# Patient Record
Sex: MALE | Race: WHITE
[De-identification: names, ages, dates, MRNs, and addresses within clinical notes are randomized per-mention and may not be internally consistent; named-entity substitution may affect disease eponyms.]

---

## 2017-05-02 ENCOUNTER — HOSPITAL ENCOUNTER (OUTPATIENT)
Age: 62
Discharge: HOME | End: 2017-05-02
Payer: MEDICAID

## 2017-05-02 DIAGNOSIS — E78.5: ICD-10-CM

## 2017-05-02 DIAGNOSIS — R73.9: ICD-10-CM

## 2017-05-02 DIAGNOSIS — I25.10: Primary | ICD-10-CM

## 2017-09-26 ENCOUNTER — HOSPITAL ENCOUNTER (OUTPATIENT)
Dept: HOSPITAL 76 - EMS | Age: 62
Discharge: ANOTHER HOSPITAL | End: 2017-09-26
Attending: SURGERY
Payer: MEDICAID

## 2017-09-26 DIAGNOSIS — R07.9: Primary | ICD-10-CM

## 2017-09-26 DIAGNOSIS — R61: ICD-10-CM

## 2017-09-26 DIAGNOSIS — R11.0: ICD-10-CM

## 2017-09-26 DIAGNOSIS — R42: ICD-10-CM

## 2018-05-22 ENCOUNTER — HOSPITAL ENCOUNTER (OUTPATIENT)
Dept: HOSPITAL 76 - LAB.N | Age: 63
End: 2018-05-22
Attending: NURSE PRACTITIONER
Payer: MEDICAID

## 2018-05-22 DIAGNOSIS — N40.0: ICD-10-CM

## 2018-05-22 DIAGNOSIS — I25.10: Primary | ICD-10-CM

## 2018-05-22 DIAGNOSIS — R73.9: ICD-10-CM

## 2018-05-22 DIAGNOSIS — E78.5: ICD-10-CM

## 2018-05-22 LAB
ALBUMIN DIAFP-MCNC: 4.6 G/DL (ref 3.2–5.5)
ALBUMIN/GLOB SERPL: 1.6 {RATIO} (ref 1–2.2)
ALP SERPL-CCNC: 55 IU/L (ref 42–121)
ALT SERPL W P-5'-P-CCNC: 27 IU/L (ref 10–60)
ANION GAP SERPL CALCULATED.4IONS-SCNC: 7 MMOL/L (ref 6–13)
AST SERPL W P-5'-P-CCNC: 29 IU/L (ref 10–42)
BASOPHILS NFR BLD AUTO: 0 10^3/UL (ref 0–0.1)
BASOPHILS NFR BLD AUTO: 0.7 %
BILIRUB BLD-MCNC: 1.3 MG/DL (ref 0.2–1)
BUN SERPL-MCNC: 20 MG/DL (ref 6–20)
CALCIUM UR-MCNC: 9.2 MG/DL (ref 8.5–10.3)
CHLORIDE SERPL-SCNC: 99 MMOL/L (ref 101–111)
CHOLEST SERPL-MCNC: 173 MG/DL
CO2 SERPL-SCNC: 28 MMOL/L (ref 21–32)
CREAT SERPLBLD-SCNC: 1.1 MG/DL (ref 0.6–1.2)
EOSINOPHIL # BLD AUTO: 0.1 10^3/UL (ref 0–0.7)
EOSINOPHIL NFR BLD AUTO: 1.9 %
ERYTHROCYTE [DISTWIDTH] IN BLOOD BY AUTOMATED COUNT: 13.7 % (ref 12–15)
GFRSERPLBLD MDRD-ARVRAT: 68 ML/MIN/{1.73_M2} (ref 89–?)
GLOBULIN SER-MCNC: 2.8 G/DL (ref 2.1–4.2)
GLUCOSE SERPL-MCNC: 110 MG/DL (ref 70–100)
HDLC SERPL-MCNC: 54 MG/DL
HDLC SERPL: 3.2 {RATIO} (ref ?–5)
HGB UR QL STRIP: 15.1 G/DL (ref 14–18)
LDLC SERPL CALC-MCNC: 101 MG/DL
LDLC/HDLC SERPL: 1.9 {RATIO} (ref ?–3.6)
LYMPHOCYTES # SPEC AUTO: 1.6 10^3/UL (ref 1.5–3.5)
LYMPHOCYTES NFR BLD AUTO: 29.4 %
MCH RBC QN AUTO: 30.8 PG (ref 27–31)
MCHC RBC AUTO-ENTMCNC: 33.5 G/DL (ref 32–36)
MCV RBC AUTO: 91.9 FL (ref 80–94)
MONOCYTES # BLD AUTO: 0.7 10^3/UL (ref 0–1)
MONOCYTES NFR BLD AUTO: 12.5 %
NEUTROPHILS # BLD AUTO: 3.1 10^3/UL (ref 1.5–6.6)
NEUTROPHILS # SNV AUTO: 5.6 X10^3/UL (ref 4.8–10.8)
NEUTROPHILS NFR BLD AUTO: 55.5 %
PDW BLD AUTO: 8.5 FL (ref 7.4–11.4)
PLATELET # BLD: 253 10^3/UL (ref 130–450)
PROT SPEC-MCNC: 7.4 G/DL (ref 6.7–8.2)
RBC MAR: 4.92 10^6/UL (ref 4.7–6.1)
SODIUM SERPLBLD-SCNC: 134 MMOL/L (ref 135–145)
VLDLC SERPL-SCNC: 18 MG/DL

## 2018-05-22 PROCEDURE — 83721 ASSAY OF BLOOD LIPOPROTEIN: CPT

## 2018-05-22 PROCEDURE — 80053 COMPREHEN METABOLIC PANEL: CPT

## 2018-05-22 PROCEDURE — 80061 LIPID PANEL: CPT

## 2018-05-22 PROCEDURE — 85025 COMPLETE CBC W/AUTO DIFF WBC: CPT

## 2018-05-22 PROCEDURE — 36415 COLL VENOUS BLD VENIPUNCTURE: CPT

## 2018-05-22 PROCEDURE — 84153 ASSAY OF PSA TOTAL: CPT

## 2019-02-02 ENCOUNTER — HOSPITAL ENCOUNTER (EMERGENCY)
Dept: HOSPITAL 76 - ED | Age: 64
Discharge: HOME | End: 2019-02-02
Payer: MEDICAID

## 2019-02-02 VITALS — DIASTOLIC BLOOD PRESSURE: 72 MMHG | SYSTOLIC BLOOD PRESSURE: 136 MMHG

## 2019-02-02 DIAGNOSIS — Z79.82: ICD-10-CM

## 2019-02-02 DIAGNOSIS — S22.42XA: Primary | ICD-10-CM
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
## 2019-02-02 DIAGNOSIS — E78.00: ICD-10-CM

## 2019-02-02 PROCEDURE — 96372 THER/PROPH/DIAG INJ SC/IM: CPT

## 2019-02-02 PROCEDURE — 99283 EMERGENCY DEPT VISIT LOW MDM: CPT

## 2019-02-02 PROCEDURE — 71101 X-RAY EXAM UNILAT RIBS/CHEST: CPT

## 2019-02-02 NOTE — XRAY REPORT
Reason:  SLIP/ FELL INJURING L SIDE RIB. PAIN/TENDERNESS L

Procedure Date:  02/02/2019   

Accession Number:  [id number] / [id number]                    

Procedure:  XR  - Ribs w/PA Chest LT CPT Code:  

 

FULL RESULT:

 

 

EXAM:

LEFT RIB RADIOGRAPHY

 

EXAM DATE: 2/2/2019 09:52 PM.

 

CLINICAL HISTORY: SLIP/ FELL INJURING L SIDE RIB.  PAIN/TENDERNESS L.

 

COMPARISON: CHEST 2 VIEW PA/LAT 02/04/2015 6:27 AM.

 

TECHNIQUE: 1 view of the chest and 2 views of the ribs.

 

FINDINGS:

Bones: Mildly displaced fracture of the left lateral sixth rib. Likely 

nondisplaced fracture of the left lateral seventh rib.

 

Lungs: Minimal left basilar atelectasis. Trace right effusion. No 

pneumothorax.

 

Mediastinum: Heart and mediastinal contours are unremarkable.

 

Other: None.

IMPRESSION: Mildly displaced left lateral sixth rib fracture and likely 

nondisplaced left lateral seventh rib fracture.

 

RADIA

## 2019-02-02 NOTE — ED PHYSICIAN DOCUMENTATION
PD HPI TRUNK INJURY





- Stated complaint


Stated Complaint: RIB PX





- Chief complaint


Chief Complaint: Trauma Ch/Bk





- History obtained from


History obtained from: Patient, Family





- History of Present Illness


Location: Left chest


Type of injury: Fall


Timing - onset: How many days ago (2)


Timing - duration: Days (2)


Timing - details: Abrupt onset


Pain level max: 10


Pain level now: 10


Quality: Pain, Aching


Improved by: Rest


Worsened by: Moving, Palpating


Associated symtptoms: No: Weakness, Numbness, Tingling, Swelling, Discoloration,

Feel faint, Syncope


Contributing factors: No: Anticoagulated





- Additional information


Additional information: 





64-year-old male states that he tripped fell landing on the whole of a 

fiberglass boat on his left ribs 2 days ago.  Worsening pain since that time.  

States took Vicodin without relief at home.





Review of Systems


Constitutional: denies: Fever, Chills


Cardiac: denies: Chest pain / pressure


Respiratory: denies: Dyspnea, Hemoptysis, Wheezing


GI: denies: Vomiting


: denies: Dysuria


Skin: denies: Rash


Musculoskeletal: denies: Neck pain, Back pain


Neurologic: denies: Headache





PD PAST MEDICAL HISTORY





- Past Medical History


Cardiovascular: High cholesterol


Respiratory: None


Endocrine/Autoimmune: None


GI: None


: Benign prostate hypertrophy


HEENT: None


Derm: None





- Past Surgical History


Past Surgical History: Yes


Ortho: Rotator cuff repair, Other





- Present Medications


Home Medications: 


                                Ambulatory Orders











 Medication  Instructions  Recorded  Confirmed


 


Aspirin [Aspir 81]  02/04/15 02/04/15


 


Azithromycin [Zithromax] 250 mg PO DAILY #4 tablet 02/04/15 


 


Benzonatate [Tessalon Perle] 100 mg PO Q8H PRN #20 capsule 02/04/15 


 


Simvastatin  02/04/15 02/04/15


 


Tamsulosin [Flomax]  02/04/15 02/04/15


 


guaiFENesin/CODEINE [Robitussin AC] 10 ml PO Q6H PRN #120 udc 02/04/15 


 


Cyclobenzaprine [Flexeril] 10 mg PO TID PRN #20 tablet 02/02/19 


 


HYDROmorphone [Dilaudid] 2 mg PO Q6H PRN #14 tablet 02/02/19 














- Allergies


Allergies/Adverse Reactions: 


                                    Allergies











Allergy/AdvReac Type Severity Reaction Status Date / Time


 


Sulfa (Sulfonamide Allergy  Hives Verified 02/02/19 20:42





Antibiotics)     


 


acetaminophen [From Tylenol] AdvReac  Nausea Verified 02/02/19 20:42


 


ibuprofen AdvReac  Nausea Verified 02/02/19 20:42














- Social History


Does the pt smoke?: No


Smoking Status: Never smoker


Does the pt drink ETOH?: Yes


Does the pt have substance abuse?: No





- Immunizations


Immunizations are current?: Yes





PD ED PE NORMAL





- Vitals


Vital signs reviewed: Yes





- General


General: Alert and oriented X 3, No acute distress





- HEENT


HEENT: Moist mucous membranes





- Neck


Neck: Supple, no meningeal sign





- Cardiac


Cardiac: RRR, Strong equal pulses





- Respiratory


Respiratory: No respiratory distress, Clear bilaterally, Other (Tender to 

palpation over the left lateral ribs, approximately 5 through 10. No ecchymosis 

or crepitus)





- Abdomen


Abdomen: Soft, Non tender, Non distended





- Back


Back: No spinal TTP





- Derm


Derm: Warm and dry





- Neuro


Neuro: Alert and oriented X 3





Results





- Vitals


Vitals: 


                               Vital Signs - 24 hr











  02/02/19 02/02/19 02/02/19





  20:37 20:41 22:32


 


Temperature 36.8 C  


 


Heart Rate 71 71 70


 


Respiratory 18 18 16





Rate   


 


Blood Pressure 139/72 H 139/72 H 136/72 H


 


O2 Saturation 97 97 98








                                     Oxygen











O2 Source                      Room air

















- Rads (name of study)


  ** L rib xray


Radiology: Prelim report reviewed, EMP read contemporaneously, See rad report 

(Mildly displaced left lateral sixth rib fracture and likely nondisplaced left 

lateral seventh rib fracture)





PD MEDICAL DECISION MAKING





- ED course


Complexity details: reviewed results, re-evaluated patient, considered 

differential, d/w patient


ED course: 





64-year-old male with left-sided rib fractures.  Pain well controlled.  Does 

have a long history of narcotic use, therefore hydromorphone will be used for a 

very short period of time.  He has a tolerance from his years of prescription 

narcotic use.  Has chronic back pain.  He is well-appearing, nontoxic afebrile. 

No hypoxia.  Patient counseled regarding signs and symptoms for which I believe 

and urgent re-evaluation would be necessary. Patient with good understanding of 

and agreement to plan and is comfortable going home at this time





This document was made in part using voice recognition software. While efforts 

are made to proofread this document, sound alike and grammatical errors may 

occur.





Departure





- Departure


Disposition: 01 Home, Self Care


Clinical Impression: 


Closed rib fracture


Qualifiers:


 Encounter type: initial encounter Rib fracture type: multiple ribs Laterality: 

left Qualified Code(s): S22.42XA - Multiple fractures of ribs, left side, 

initial encounter for closed fracture





Condition: Good


Instructions:  ED Fx Rib


Follow-Up: 


Anne Orozco ARNP [Primary Care Provider] - Within 1 week


Prescriptions: 


Cyclobenzaprine [Flexeril] 10 mg PO TID PRN #20 tablet


 PRN Reason: Spasms


HYDROmorphone [Dilaudid] 2 mg PO Q6H PRN #14 tablet


 PRN Reason: rib pain


Comments: 


Use the medication as prescribed.  Return if you worsen.  Follow-up with your 

doctor for further care.  Your ribs may take several weeks to heal.


Do not drink alcohol or drive while on narcotic pain medicine. 


Note that many narcotic pain relievers also contain tylenol/acetaminophen. 

Please ensure that your total dose of acetaminophen from all sources does not 

exceed 3 grams (3000mg) per day. 


You may constipated on this medication, take a stool softener such as "Colace" 

twice a day while you are on it. Also recommend a over-the-counter laxative such

 as senna or MiraLAX any day that you do not have a bowel movement. 


If you received narcotic pain medication in the emergency department, do not 

drive or operate machinery for the next 24 hours.





Discharge Date/Time: 02/02/19 22:32

## 2019-02-12 ENCOUNTER — HOSPITAL ENCOUNTER (OUTPATIENT)
Dept: HOSPITAL 76 - DI.N | Age: 64
Discharge: HOME | End: 2019-02-12
Attending: NURSE PRACTITIONER
Payer: MEDICAID

## 2019-02-12 DIAGNOSIS — R91.8: ICD-10-CM

## 2019-02-12 DIAGNOSIS — S22.42XD: Primary | ICD-10-CM

## 2019-02-12 NOTE — XRAY REPORT
Reason:  L SIDE RIB PAIN

Procedure Date:  02/12/2019   

Accession Number:  [id number] / [id number]                    

Procedure:  XRN - Ribs 2 View LT CPT Code:  

 

FULL RESULT:

 

 

EXAM:

LEFT RIB RADIOGRAPHY

 

EXAM DATE: 2/12/2019 03:16 PM.

 

CLINICAL HISTORY: L SIDE RIB PAIN.

 

COMPARISON: RIBS W/PA CHEST LT 02/02/2019 9:42 PM.

 

TECHNIQUE: 2 views.

 

FINDINGS:

Bones: As before, mildly displaced left lateral sixth rib fracture, 

contour deformity suggesting nondisplaced left lateral seventh rib 

fracture. Suture anchors at the left shoulder.

 

Lungs: No detectable pneumothorax. Left basal opacity suggesting small 

effusion and atelectasis and/or infiltrate. Left lung volume is low. 

Right lung is excluded.

 

Mediastinum: Heart and cardiomediastinal contours are unremarkable to the 

extent visualized.

 

Other: None.

IMPRESSION:

1. Similar mildly displaced left lateral sixth rib fracture and probable 

nondisplaced left lateral seventh rib fracture.

2. Similar left basal opacity suggesting small effusion and atelectasis 

and/or infiltrate.

 

[facility]

## 2019-09-19 ENCOUNTER — HOSPITAL ENCOUNTER (OUTPATIENT)
Dept: HOSPITAL 76 - LAB.N | Age: 64
Discharge: HOME | End: 2019-09-19
Attending: NURSE PRACTITIONER
Payer: MEDICAID

## 2019-09-19 DIAGNOSIS — Z79.899: ICD-10-CM

## 2019-09-19 DIAGNOSIS — N40.0: ICD-10-CM

## 2019-09-19 DIAGNOSIS — E78.5: Primary | ICD-10-CM

## 2019-09-19 LAB
ALBUMIN DIAFP-MCNC: 4.6 G/DL (ref 3.2–5.5)
ALBUMIN/GLOB SERPL: 1.4 {RATIO} (ref 1–2.2)
ALP SERPL-CCNC: 62 IU/L (ref 42–121)
ALT SERPL W P-5'-P-CCNC: 32 IU/L (ref 10–60)
ANION GAP SERPL CALCULATED.4IONS-SCNC: 5 MMOL/L (ref 6–13)
AST SERPL W P-5'-P-CCNC: 32 IU/L (ref 10–42)
BILIRUB BLD-MCNC: 0.9 MG/DL (ref 0.2–1)
BUN SERPL-MCNC: 23 MG/DL (ref 6–20)
CALCIUM UR-MCNC: 9.5 MG/DL (ref 8.5–10.3)
CHLORIDE SERPL-SCNC: 104 MMOL/L (ref 101–111)
CHOLEST SERPL-MCNC: 179 MG/DL
CO2 SERPL-SCNC: 29 MMOL/L (ref 21–32)
CREAT SERPLBLD-SCNC: 1 MG/DL (ref 0.6–1.2)
GFRSERPLBLD MDRD-ARVRAT: 76 ML/MIN/{1.73_M2} (ref 89–?)
GLOBULIN SER-MCNC: 3.2 G/DL (ref 2.1–4.2)
GLUCOSE SERPL-MCNC: 101 MG/DL (ref 70–100)
HDLC SERPL-MCNC: 60 MG/DL
HDLC SERPL: 3 {RATIO} (ref ?–5)
LDLC SERPL CALC-MCNC: 96 MG/DL
LDLC/HDLC SERPL: 1.6 {RATIO} (ref ?–3.6)
PROT SPEC-MCNC: 7.8 G/DL (ref 6.7–8.2)
SODIUM SERPLBLD-SCNC: 138 MMOL/L (ref 135–145)
VLDLC SERPL-SCNC: 23 MG/DL

## 2019-09-19 PROCEDURE — 80061 LIPID PANEL: CPT

## 2019-09-19 PROCEDURE — 83721 ASSAY OF BLOOD LIPOPROTEIN: CPT

## 2019-09-19 PROCEDURE — 80053 COMPREHEN METABOLIC PANEL: CPT

## 2019-09-19 PROCEDURE — 36415 COLL VENOUS BLD VENIPUNCTURE: CPT

## 2019-09-19 PROCEDURE — 84153 ASSAY OF PSA TOTAL: CPT

## 2019-12-05 ENCOUNTER — HOSPITAL ENCOUNTER (OUTPATIENT)
Dept: HOSPITAL 76 - SDS | Age: 64
Discharge: HOME | End: 2019-12-05
Attending: SURGERY
Payer: MEDICAID

## 2019-12-05 VITALS — SYSTOLIC BLOOD PRESSURE: 120 MMHG | DIASTOLIC BLOOD PRESSURE: 69 MMHG

## 2019-12-05 DIAGNOSIS — Z12.11: Primary | ICD-10-CM

## 2019-12-05 DIAGNOSIS — I25.10: ICD-10-CM

## 2019-12-05 DIAGNOSIS — N40.0: ICD-10-CM

## 2019-12-05 PROCEDURE — 0DJD8ZZ INSPECTION OF LOWER INTESTINAL TRACT, VIA NATURAL OR ARTIFICIAL OPENING ENDOSCOPIC: ICD-10-PCS | Performed by: SURGERY

## 2019-12-05 PROCEDURE — 45378 DIAGNOSTIC COLONOSCOPY: CPT

## 2020-06-29 ENCOUNTER — HOSPITAL ENCOUNTER (OUTPATIENT)
Dept: HOSPITAL 76 - LAB | Age: 65
Discharge: HOME | End: 2020-06-29
Attending: FAMILY MEDICINE
Payer: MEDICAID

## 2020-06-29 DIAGNOSIS — Z11.59: Primary | ICD-10-CM

## 2020-06-29 PROCEDURE — 81599 UNLISTED MAAA: CPT

## 2021-04-15 ENCOUNTER — HOSPITAL ENCOUNTER (OUTPATIENT)
Dept: HOSPITAL 76 - LAB.N | Age: 66
Discharge: HOME | End: 2021-04-15
Attending: INTERNAL MEDICINE
Payer: MEDICAID

## 2021-04-15 DIAGNOSIS — E78.5: ICD-10-CM

## 2021-04-15 DIAGNOSIS — N40.0: ICD-10-CM

## 2021-04-15 DIAGNOSIS — G62.9: ICD-10-CM

## 2021-04-15 DIAGNOSIS — I25.10: Primary | ICD-10-CM

## 2021-04-15 DIAGNOSIS — R73.9: ICD-10-CM

## 2021-04-15 LAB
ALBUMIN DIAFP-MCNC: 4.7 G/DL (ref 3.2–5.5)
ALBUMIN/GLOB SERPL: 1.6 {RATIO} (ref 1–2.2)
ALP SERPL-CCNC: 66 IU/L (ref 42–121)
ALT SERPL W P-5'-P-CCNC: 25 IU/L (ref 10–60)
ANION GAP SERPL CALCULATED.4IONS-SCNC: 8 MMOL/L (ref 6–13)
AST SERPL W P-5'-P-CCNC: 27 IU/L (ref 10–42)
BASOPHILS NFR BLD AUTO: 0 10^3/UL (ref 0–0.1)
BASOPHILS NFR BLD AUTO: 0.4 %
BILIRUB BLD-MCNC: 0.8 MG/DL (ref 0.2–1)
BUN SERPL-MCNC: 22 MG/DL (ref 6–20)
CALCIUM UR-MCNC: 9.6 MG/DL (ref 8.5–10.3)
CHLORIDE SERPL-SCNC: 102 MMOL/L (ref 101–111)
CHOLEST SERPL-MCNC: 181 MG/DL
CO2 SERPL-SCNC: 27 MMOL/L (ref 21–32)
CREAT SERPLBLD-SCNC: 0.9 MG/DL (ref 0.6–1.2)
EOSINOPHIL # BLD AUTO: 0.1 10^3/UL (ref 0–0.7)
EOSINOPHIL NFR BLD AUTO: 1 %
ERYTHROCYTE [DISTWIDTH] IN BLOOD BY AUTOMATED COUNT: 12.7 % (ref 12–15)
EST. AVERAGE GLUCOSE BLD GHB EST-MCNC: 126 MG/DL (ref 70–100)
GFRSERPLBLD MDRD-ARVRAT: 85 ML/MIN/{1.73_M2} (ref 89–?)
GLOBULIN SER-MCNC: 2.9 G/DL (ref 2.1–4.2)
GLUCOSE SERPL-MCNC: 110 MG/DL (ref 70–100)
HBA1C MFR BLD HPLC: 6 % (ref 4.27–6.07)
HCT VFR BLD AUTO: 48.3 % (ref 42–52)
HDLC SERPL-MCNC: 60 MG/DL
HDLC SERPL: 3 {RATIO} (ref ?–5)
HGB UR QL STRIP: 15.5 G/DL (ref 14–18)
LDLC SERPL CALC-MCNC: 98 MG/DL
LDLC/HDLC SERPL: 1.6 {RATIO} (ref ?–3.6)
LYMPHOCYTES # SPEC AUTO: 1.5 10^3/UL (ref 1.5–3.5)
LYMPHOCYTES NFR BLD AUTO: 29.4 %
MCH RBC QN AUTO: 29.8 PG (ref 27–31)
MCHC RBC AUTO-ENTMCNC: 32.1 G/DL (ref 32–36)
MCV RBC AUTO: 92.9 FL (ref 80–94)
MONOCYTES # BLD AUTO: 0.7 10^3/UL (ref 0–1)
MONOCYTES NFR BLD AUTO: 13.8 %
NEUTROPHILS # BLD AUTO: 2.9 10^3/UL (ref 1.5–6.6)
NEUTROPHILS # SNV AUTO: 5.2 X10^3/UL (ref 4.8–10.8)
NEUTROPHILS NFR BLD AUTO: 55.2 %
NRBC # BLD AUTO: 0 /100WBC
NRBC # BLD AUTO: 0 X10^3/UL
PDW BLD AUTO: 10.5 FL (ref 7.4–11.4)
PLATELET # BLD: 314 10^3/UL (ref 130–450)
POTASSIUM SERPL-SCNC: 3.9 MMOL/L (ref 3.5–5)
PROT SPEC-MCNC: 7.6 G/DL (ref 6.7–8.2)
RBC MAR: 5.2 10^6/UL (ref 4.7–6.1)
SODIUM SERPLBLD-SCNC: 137 MMOL/L (ref 135–145)
TRIGL P FAST SERPL-MCNC: 116 MG/DL
TSH SERPL-ACNC: 2.13 UIU/ML (ref 0.34–5.6)
VIT B12 SERPL-MCNC: 532 PG/ML (ref 180–914)
VLDLC SERPL-SCNC: 23 MG/DL

## 2021-04-15 PROCEDURE — 82043 UR ALBUMIN QUANTITATIVE: CPT

## 2021-04-15 PROCEDURE — 36415 COLL VENOUS BLD VENIPUNCTURE: CPT

## 2021-04-15 PROCEDURE — 85025 COMPLETE CBC W/AUTO DIFF WBC: CPT

## 2021-04-15 PROCEDURE — 82570 ASSAY OF URINE CREATININE: CPT

## 2021-04-15 PROCEDURE — 83036 HEMOGLOBIN GLYCOSYLATED A1C: CPT

## 2021-04-15 PROCEDURE — 82607 VITAMIN B-12: CPT

## 2021-04-15 PROCEDURE — 84153 ASSAY OF PSA TOTAL: CPT

## 2021-04-15 PROCEDURE — 81599 UNLISTED MAAA: CPT

## 2021-04-15 PROCEDURE — 86334 IMMUNOFIX E-PHORESIS SERUM: CPT

## 2021-04-15 PROCEDURE — 80061 LIPID PANEL: CPT

## 2021-04-15 PROCEDURE — 80053 COMPREHEN METABOLIC PANEL: CPT

## 2021-04-15 PROCEDURE — 84443 ASSAY THYROID STIM HORMONE: CPT

## 2021-04-15 PROCEDURE — 84165 PROTEIN E-PHORESIS SERUM: CPT

## 2021-04-15 PROCEDURE — 84155 ASSAY OF PROTEIN SERUM: CPT

## 2021-04-15 PROCEDURE — 83721 ASSAY OF BLOOD LIPOPROTEIN: CPT

## 2021-04-17 LAB
ALBUMIN SERPL-MCNC: 4.6 G/DL (ref 3.8–4.8)
ALPHA1 GLOB SERPL ELPH-MCNC: 0.3 G/DL (ref 0.2–0.3)
ALPHA2 GLOB SERPL ELPH-MCNC: 0.6 G/DL (ref 0.5–0.9)
BETA1 GLOB FLD ELPH-MCNC: 0.5 G/DL (ref 0.4–0.6)
BETA2 GLOB FLD ELPH-MCNC: 0.4 G/DL (ref 0.2–0.5)
GAMMA GLOB SERPL ELPH-MCNC: 0.9 G/DL (ref 0.8–1.7)
IMP & REVIEW OF LAB RESULTS: (no result)

## 2021-04-30 ENCOUNTER — HOSPITAL ENCOUNTER (OUTPATIENT)
Dept: HOSPITAL 76 - LAB.WCP | Age: 66
Discharge: HOME | End: 2021-04-30
Attending: INTERNAL MEDICINE
Payer: MEDICAID

## 2021-04-30 DIAGNOSIS — R73.9: Primary | ICD-10-CM

## 2021-04-30 LAB
CREAT UR-SCNC: 190 MG/DL
MICROALBUMIN UR-MCNC: 0.4 MG/DL (ref 0–300)
MICROALBUMIN/CREAT RATIO PNL UR: 2.1 UG/MG (ref ?–30)

## 2021-04-30 PROCEDURE — 82570 ASSAY OF URINE CREATININE: CPT

## 2021-04-30 PROCEDURE — 82043 UR ALBUMIN QUANTITATIVE: CPT

## 2022-08-30 ENCOUNTER — HOSPITAL ENCOUNTER (OUTPATIENT)
Dept: HOSPITAL 76 - LAB.N | Age: 67
Discharge: HOME | End: 2022-08-30
Attending: INTERNAL MEDICINE
Payer: MEDICAID

## 2022-08-30 DIAGNOSIS — R73.01: ICD-10-CM

## 2022-08-30 DIAGNOSIS — E78.5: Primary | ICD-10-CM

## 2022-08-30 DIAGNOSIS — N18.2: ICD-10-CM

## 2022-08-30 DIAGNOSIS — N40.1: ICD-10-CM

## 2022-08-30 LAB
ALBUMIN DIAFP-MCNC: 4.5 G/DL (ref 3.2–5.5)
ALBUMIN/GLOB SERPL: 1.6 {RATIO} (ref 1–2.2)
ALP SERPL-CCNC: 59 IU/L (ref 42–121)
ALT SERPL W P-5'-P-CCNC: 26 IU/L (ref 10–60)
ANION GAP SERPL CALCULATED.4IONS-SCNC: 7 MMOL/L (ref 6–13)
AST SERPL W P-5'-P-CCNC: 24 IU/L (ref 10–42)
BASOPHILS NFR BLD AUTO: 0 10^3/UL (ref 0–0.1)
BASOPHILS NFR BLD AUTO: 0.5 %
BILIRUB BLD-MCNC: 0.8 MG/DL (ref 0.2–1)
BUN SERPL-MCNC: 25 MG/DL (ref 6–20)
CALCIUM UR-MCNC: 9.2 MG/DL (ref 8.5–10.3)
CHLORIDE SERPL-SCNC: 99 MMOL/L (ref 101–111)
CHOLEST SERPL-MCNC: 123 MG/DL
CO2 SERPL-SCNC: 27 MMOL/L (ref 21–32)
CREAT SERPLBLD-SCNC: 1 MG/DL (ref 0.6–1.2)
EOSINOPHIL # BLD AUTO: 0.1 10^3/UL (ref 0–0.7)
EOSINOPHIL NFR BLD AUTO: 1.7 %
ERYTHROCYTE [DISTWIDTH] IN BLOOD BY AUTOMATED COUNT: 13.1 % (ref 12–15)
GFRSERPLBLD MDRD-ARVRAT: 75 ML/MIN/{1.73_M2} (ref 89–?)
GLOBULIN SER-MCNC: 2.9 G/DL (ref 2.1–4.2)
GLUCOSE SERPL-MCNC: 103 MG/DL (ref 70–100)
HCT VFR BLD AUTO: 43.5 % (ref 42–52)
HDLC SERPL-MCNC: 54 MG/DL
HDLC SERPL: 2.3 {RATIO} (ref ?–5)
HGB UR QL STRIP: 14.4 G/DL (ref 14–18)
LDLC SERPL CALC-MCNC: 45 MG/DL
LDLC/HDLC SERPL: 0.8 {RATIO} (ref ?–3.6)
LYMPHOCYTES # SPEC AUTO: 1.7 10^3/UL (ref 1.5–3.5)
LYMPHOCYTES NFR BLD AUTO: 28.8 %
MCH RBC QN AUTO: 30.4 PG (ref 27–31)
MCHC RBC AUTO-ENTMCNC: 33.1 G/DL (ref 32–36)
MCV RBC AUTO: 91.8 FL (ref 80–94)
MONOCYTES # BLD AUTO: 0.7 10^3/UL (ref 0–1)
MONOCYTES NFR BLD AUTO: 11.4 %
NEUTROPHILS # BLD AUTO: 3.4 10^3/UL (ref 1.5–6.6)
NEUTROPHILS # SNV AUTO: 5.9 X10^3/UL (ref 4.8–10.8)
NEUTROPHILS NFR BLD AUTO: 57.3 %
NRBC # BLD AUTO: 0 /100WBC
NRBC # BLD AUTO: 0 X10^3/UL
PDW BLD AUTO: 10 FL (ref 7.4–11.4)
PLATELET # BLD: 281 10^3/UL (ref 130–450)
POTASSIUM SERPL-SCNC: 4.1 MMOL/L (ref 3.5–5)
PROT SPEC-MCNC: 7.4 G/DL (ref 6.7–8.2)
RBC MAR: 4.74 10^6/UL (ref 4.7–6.1)
SODIUM SERPLBLD-SCNC: 133 MMOL/L (ref 135–145)
TRIGL P FAST SERPL-MCNC: 121 MG/DL
VLDLC SERPL-SCNC: 24 MG/DL

## 2022-08-30 PROCEDURE — 83721 ASSAY OF BLOOD LIPOPROTEIN: CPT

## 2022-08-30 PROCEDURE — 83036 HEMOGLOBIN GLYCOSYLATED A1C: CPT

## 2022-08-30 PROCEDURE — 82570 ASSAY OF URINE CREATININE: CPT

## 2022-08-30 PROCEDURE — 85025 COMPLETE CBC W/AUTO DIFF WBC: CPT

## 2022-08-30 PROCEDURE — 80053 COMPREHEN METABOLIC PANEL: CPT

## 2022-08-30 PROCEDURE — 80061 LIPID PANEL: CPT

## 2022-08-30 PROCEDURE — 84153 ASSAY OF PSA TOTAL: CPT

## 2022-08-30 PROCEDURE — 36415 COLL VENOUS BLD VENIPUNCTURE: CPT

## 2022-08-30 PROCEDURE — 82043 UR ALBUMIN QUANTITATIVE: CPT

## 2022-08-31 LAB
EST. AVERAGE GLUCOSE BLD GHB EST-MCNC: 131 MG/DL (ref 70–100)
HBA1C MFR BLD HPLC: 6.2 % (ref 4.27–6.07)

## 2022-09-01 ENCOUNTER — HOSPITAL ENCOUNTER (OUTPATIENT)
Dept: HOSPITAL 76 - LAB.WCP | Age: 67
Discharge: HOME | End: 2022-09-01
Attending: INTERNAL MEDICINE
Payer: MEDICAID

## 2022-09-01 DIAGNOSIS — R73.01: Primary | ICD-10-CM

## 2022-09-01 LAB
CREAT UR-SCNC: 68.2 MG/DL
MICROALBUMIN UR-MCNC: 0.2 MG/DL (ref 0–300)
MICROALBUMIN/CREAT RATIO PNL UR: 2.9 UG/MG (ref ?–30)

## 2022-09-01 PROCEDURE — 82570 ASSAY OF URINE CREATININE: CPT

## 2022-09-01 PROCEDURE — 82043 UR ALBUMIN QUANTITATIVE: CPT

## 2022-09-19 ENCOUNTER — HOSPITAL ENCOUNTER (EMERGENCY)
Dept: HOSPITAL 76 - ED | Age: 67
Discharge: HOME | End: 2022-09-19
Payer: MEDICAID

## 2022-09-19 VITALS — DIASTOLIC BLOOD PRESSURE: 81 MMHG | SYSTOLIC BLOOD PRESSURE: 135 MMHG

## 2022-09-19 DIAGNOSIS — K59.00: Primary | ICD-10-CM

## 2022-09-19 LAB
ALBUMIN DIAFP-MCNC: 4.5 G/DL (ref 3.2–5.5)
ALBUMIN/GLOB SERPL: 1.5 {RATIO} (ref 1–2.2)
ALP SERPL-CCNC: 77 IU/L (ref 42–121)
ALT SERPL W P-5'-P-CCNC: 24 IU/L (ref 10–60)
ANION GAP SERPL CALCULATED.4IONS-SCNC: 7 MMOL/L (ref 6–13)
AST SERPL W P-5'-P-CCNC: 22 IU/L (ref 10–42)
BASOPHILS NFR BLD AUTO: 0 10^3/UL (ref 0–0.1)
BASOPHILS NFR BLD AUTO: 0.5 %
BILIRUB BLD-MCNC: 0.6 MG/DL (ref 0.2–1)
BUN SERPL-MCNC: 23 MG/DL (ref 6–20)
CALCIUM UR-MCNC: 9.8 MG/DL (ref 8.5–10.3)
CHLORIDE SERPL-SCNC: 100 MMOL/L (ref 101–111)
CO2 SERPL-SCNC: 29 MMOL/L (ref 21–32)
CREAT SERPLBLD-SCNC: 1 MG/DL (ref 0.6–1.2)
EOSINOPHIL # BLD AUTO: 0.1 10^3/UL (ref 0–0.7)
EOSINOPHIL NFR BLD AUTO: 1.4 %
ERYTHROCYTE [DISTWIDTH] IN BLOOD BY AUTOMATED COUNT: 12.9 % (ref 12–15)
GFRSERPLBLD MDRD-ARVRAT: 75 ML/MIN/{1.73_M2} (ref 89–?)
GLOBULIN SER-MCNC: 3 G/DL (ref 2.1–4.2)
GLUCOSE SERPL-MCNC: 102 MG/DL (ref 70–100)
HCT VFR BLD AUTO: 45 % (ref 42–52)
HGB UR QL STRIP: 14.9 G/DL (ref 14–18)
LIPASE SERPL-CCNC: 40 U/L (ref 22–51)
LYMPHOCYTES # SPEC AUTO: 1.4 10^3/UL (ref 1.5–3.5)
LYMPHOCYTES NFR BLD AUTO: 22.7 %
MCH RBC QN AUTO: 30.6 PG (ref 27–31)
MCHC RBC AUTO-ENTMCNC: 33.1 G/DL (ref 32–36)
MCV RBC AUTO: 92.4 FL (ref 80–94)
MONOCYTES # BLD AUTO: 0.7 10^3/UL (ref 0–1)
MONOCYTES NFR BLD AUTO: 10.6 %
NEUTROPHILS # BLD AUTO: 4 10^3/UL (ref 1.5–6.6)
NEUTROPHILS # SNV AUTO: 6.2 X10^3/UL (ref 4.8–10.8)
NEUTROPHILS NFR BLD AUTO: 64.5 %
NRBC # BLD AUTO: 0 /100WBC
NRBC # BLD AUTO: 0 X10^3/UL
PDW BLD AUTO: 9.5 FL (ref 7.4–11.4)
PLATELET # BLD: 290 10^3/UL (ref 130–450)
POTASSIUM SERPL-SCNC: 4.7 MMOL/L (ref 3.5–5)
PROT SPEC-MCNC: 7.5 G/DL (ref 6.7–8.2)
RBC MAR: 4.87 10^6/UL (ref 4.7–6.1)
SODIUM SERPLBLD-SCNC: 136 MMOL/L (ref 135–145)

## 2022-09-19 PROCEDURE — 99282 EMERGENCY DEPT VISIT SF MDM: CPT

## 2022-09-19 PROCEDURE — 96372 THER/PROPH/DIAG INJ SC/IM: CPT

## 2022-09-19 PROCEDURE — 99284 EMERGENCY DEPT VISIT MOD MDM: CPT

## 2022-09-19 PROCEDURE — 85025 COMPLETE CBC W/AUTO DIFF WBC: CPT

## 2022-09-19 PROCEDURE — 80053 COMPREHEN METABOLIC PANEL: CPT

## 2022-09-19 PROCEDURE — 83690 ASSAY OF LIPASE: CPT

## 2022-09-19 PROCEDURE — 36415 COLL VENOUS BLD VENIPUNCTURE: CPT

## 2022-09-19 PROCEDURE — 74176 CT ABD & PELVIS W/O CONTRAST: CPT

## 2022-09-19 NOTE — CT REPORT
PROCEDURE:  Abdomen/Pelvis WO

 

INDICATIONS:  R sided pain

 

TECHNIQUE:  

Noncontrast 5 mm thick sections acquired from the diaphragms to the symphysis.  5 mm coronal and sagi
ttal reformats were then performed.  For radiation dose reduction, the following was used:  automated
 exposure control, adjustment of mA and/or kV according to patient size.

 

COMPARISON:  None.

 

FINDINGS:  

Image quality:  Excellent.  

 

ABDOMEN:  

Lung bases: Bibasilar atelectasis. No pleural effusion. Heart size is normal.  

 

Solid organs:  Liver and spleen are normal in size.  Gallbladder is unremarkable.  Pancreas is normal
 in contours.  No adrenal nodules.  Kidneys are normal in size, without hydronephrosis or nephrolithi
asis.  

 

Peritoneum and bowel:  Unenhanced bowel loops demonstrate normal wall thickness and caliber. Small du
odenal diverticulum. Normal appendix. Prominent stool in the right colon. No free fluid or air.  

 

Nodes and vessels:  No retroperitoneal or mesenteric adenopathy by size criteria.  Aorta and inferior
 vena cava are normal in caliber. The proximal opacified atherosclerotic plaque. 

 

Miscellaneous: Small umbilical hernia.

 

 

PELVIS:  

Genitourinary:  Bladder wall thickness is normal.  

 

Miscellaneous:  No inguinal hernias or adenopathy. Left gluteus lipoma. 

 

Bones:  No suspicious bony lesions. Minimal height loss at L1. Anterolisthesis of L3 on L4 measuring 
0.6 cm. Large vertebral body ossifies.

 

IMPRESSION:  

1. The appendix is normal. No free fluid.

 

2. Prominent stool in the colon. This raises the possibility of constipation.

 

3. No kidney stones. No hydronephrosis.

 

Reviewed by: Johnny Yee MD on 9/19/2022 4:34 PM PDT

Approved by: Johnyn Yee MD on 9/19/2022 4:34 PM PDT

 

 

Station ID:  [id number]

## 2022-09-19 NOTE — ED PHYSICIAN DOCUMENTATION
PD HPI ABD PAIN





- Stated complaint


Stated Complaint: ABD PX





- Chief complaint


Chief Complaint: Abd Pain





- History obtained from


History obtained from: Patient





- Additional information


Additional information: 


Patient is a 67-year-old male presenting for evaluation of right lower quadrant 

pain that is been intermittent for a few weeks but more persistent over the last

few days.  It is sharp in nature.  It is worse when laying on that side.  He 

denies associated nausea, vomiting, diarrhea.  His last bowel movement was 

yesterday.  He denies dysuria or hematuria.  He reports that laying on his left 

side makes it feel better.  He has not tried anything for the pain. He denies 

previous abdominal surgeries.  He does take Plavix for a history of a TIA.  He 

denies any recent injury or trauma.  He denies chest pain, difficulty breathing,

fever, back pain, lower extremity pain.








Review of Systems


Constitutional: denies: Fever


Nose: denies: Congestion


Cardiac: denies: Chest pain / pressure


Respiratory: denies: Dyspnea


GI: reports: Abdominal Pain.  denies: Nausea, Vomiting


: denies: Dysuria, Hematuria


Musculoskeletal: denies: Back pain


Neurologic: denies: Headache





PD PAST MEDICAL HISTORY





- Past Medical History


Cardiovascular: High cholesterol


Respiratory: Pneumonia


Endocrine/Autoimmune: None


GI: Colon polyps


: None


HEENT: None


Psych: None


Musculoskeletal: Osteoarthritis, Chronic back pain


Derm: None





- Past Surgical History


Past Surgical History: Yes


Ortho: Rotator cuff repair, Spine surgery, Other





- Present Medications


Home Medications: 


                                Ambulatory Orders











 Medication  Instructions  Recorded  Confirmed


 


Aspirin [Aspir 81] 81 mg PO DAILY 02/04/15 12/05/19


 


Tamsulosin [Flomax] 0.4 mg PO DAILY 02/04/15 12/05/19


 


Cyclobenzaprine [Flexeril] 10 mg PO TID PRN #20 tablet 02/02/19 12/04/19


 


Atorvastatin Calcium 80 mg PO DAILY 12/05/19 12/05/19


 


Zolpidem Tartrate [Edluar] 10 mg PO DAILY PRN 12/05/19 12/05/19


 


polyethylene glycoL 3350 [Miralax] 17 gm PO DAILY PRN #1 ml 09/19/22 














- Allergies


Allergies/Adverse Reactions: 


                                    Allergies











Allergy/AdvReac Type Severity Reaction Status Date / Time


 


Sulfa (Sulfonamide Allergy  Hives Verified 09/19/22 12:33





Antibiotics)     


 


acetaminophen [From Tylenol] AdvReac  Nausea Verified 09/19/22 12:33


 


ibuprofen AdvReac  Nausea Verified 09/19/22 12:33














- Social History


Does the pt smoke?: No


Smoking Status: Never smoker


Does the pt drink ETOH?: Yes


Does the pt have substance abuse?: No





- Immunizations


Immunizations are current?: Yes





PD ED PE NORMAL





- General


General: Alert and oriented X 3, No acute distress, Well developed/nourished





- HEENT


HEENT: Atraumatic, Moist mucous membranes





- Neck


Neck: Supple, no meningeal sign





- Cardiac


Cardiac: RRR, No murmur, Strong equal pulses





- Respiratory


Respiratory: No respiratory distress, Clear bilaterally





- Abdomen


Abdomen: Normal bowel sounds, Soft, Non distended, Other (No masses, no hernia, 

mild right lower quadrant tenderness to palpation with no rebound or guarding). 

No: Non tender





- Back


Back: Other (Right CVA tenderness)





- Derm


Derm: Warm and dry





- Extremities


Extremities: No edema





- Neuro


Neuro: Normal speech





Results





- Vitals


Vitals: 


                               Vital Signs - 24 hr











  09/19/22 09/19/22





  12:30 16:25


 


Temperature 36.3 C L 36.8 C


 


Heart Rate 76 68


 


Respiratory 16 16





Rate  


 


Blood Pressure 137/76 H 135/81 H


 


O2 Saturation 97 99








                                     Oxygen











O2 Source                      Room air

















- Labs


Labs: 


                                Laboratory Tests











  09/19/22 09/19/22





  12:42 12:42


 


WBC  6.2 


 


RBC  4.87 


 


Hgb  14.9 


 


Hct  45.0 


 


MCV  92.4 


 


MCH  30.6 


 


MCHC  33.1 


 


RDW  12.9 


 


Plt Count  290 


 


MPV  9.5 


 


Neut # (Auto)  4.0 


 


Lymph # (Auto)  1.4 L 


 


Mono # (Auto)  0.7 


 


Eos # (Auto)  0.1 


 


Baso # (Auto)  0.0 


 


Absolute Nucleated RBC  0.00 


 


Nucleated RBC %  0.0 


 


Sodium   136


 


Potassium   4.7


 


Chloride   100 L


 


Carbon Dioxide   29


 


Anion Gap   7.0


 


BUN   23 H


 


Creatinine   1.0


 


Estimated GFR (MDRD)   75 L


 


Glucose   102 H


 


Calcium   9.8


 


Total Bilirubin   0.6


 


AST   22


 


ALT   24


 


Alkaline Phosphatase   77


 


Total Protein   7.5


 


Albumin   4.5


 


Globulin   3.0


 


Albumin/Globulin Ratio   1.5


 


Lipase   40














PD MEDICAL DECISION MAKING





- ED course


Complexity details: reviewed results, re-evaluated patient, d/w patient


ED course: 


Patient with right-sided abdominal pain which is been intermittent for weeks.  

Has mild tenderness on exam but overall exam is reassuring and he does not 

appear to have a acute abdomen.  Of vital signs and labs are reassuring.  CT 

scan is negative for appendicitis and no signs of a stone.  There is a fair 

amount of stool burden on the right side.  I reviewed these findings with the 

patient and discussed plan for MiraLAX and close outpatient follow-up.  He is c

omfortable plan for discharge and advised on strict return precautions.








Departure





- Departure


Disposition: 01 Home, Self Care


Clinical Impression: 


 Right sided abdominal pain





Constipation


Qualifiers:


 Constipation type: unspecified constipation type Qualified Code(s): K59.00 - 

Constipation, unspecified





Condition: Stable


Instructions:  ED Constipation, ED Abdominal Pain Unkn Cause Male


Prescriptions: 


polyethylene glycoL 3350 [Miralax] 17 gm PO DAILY PRN #1 ml


 PRN Reason: Constipation


Comments: 


You were evaluated for right-sided abdominal pain.  Your labs are reassuring and

 your vital signs were stable.  A CT scan was obtained and your appendix appears

 normal.  There is also no signs of a kidney stone.  You do have a fair amount 

of stool concerning for constipation On the right side of your abdomen.  You may

 benefit from regular MiraLAX use for the next several days.  I sent 

prescription to the [facility] in [place].  Please also make sure that you stay 

well-hydrated.  If you have any new or worsening symptoms please consider return

 to the ER.


Discharge Date/Time: 09/19/22 17:03

## 2022-10-07 ENCOUNTER — HOSPITAL ENCOUNTER (OUTPATIENT)
Dept: HOSPITAL 76 - DI.N | Age: 67
Discharge: HOME | End: 2022-10-07
Attending: FAMILY MEDICINE
Payer: MEDICARE

## 2022-10-07 DIAGNOSIS — M25.721: Primary | ICD-10-CM

## 2022-10-08 NOTE — XRAY REPORT
PROCEDURE:  Elbow 3 View RT

 

INDICATIONS:  RIGHT ELBOW PAIN

 

TECHNIQUE:  3 views of the elbow were acquired.  

 

COMPARISON:  None.

 

FINDINGS:  

Bones:  No fractures or dislocations. There is mild osteophytosis. No suspicious bony lesions.  

 

Soft tissues:  No elbow joint effusion.  No suspicious soft tissue calcifications.  

 

IMPRESSION:  

 

1. No fracture or dislocation.

 

2. Mild osteophytosis compatible with mild osteoarthritic changes.

 

Reviewed by: Herb Juarez MD on 10/8/2022 1:49 AM PDT

Approved by: Herb Juarez MD on 10/8/2022 1:49 AM PDT

 

 

Station ID:  [id number]

## 2023-01-03 ENCOUNTER — HOSPITAL ENCOUNTER (OUTPATIENT)
Dept: HOSPITAL 76 - DI.N | Age: 68
Discharge: HOME | End: 2023-01-03
Attending: NURSE PRACTITIONER
Payer: MEDICARE

## 2023-01-03 DIAGNOSIS — R07.81: Primary | ICD-10-CM

## 2023-01-03 DIAGNOSIS — S00.83XA: ICD-10-CM

## 2023-01-03 DIAGNOSIS — I70.201: ICD-10-CM

## 2023-01-03 DIAGNOSIS — S22.42XD: ICD-10-CM

## 2023-01-03 DIAGNOSIS — S80.01XA: ICD-10-CM

## 2023-01-03 DIAGNOSIS — J98.11: ICD-10-CM

## 2023-01-04 NOTE — XRAY REPORT
PROCEDURE:  Nasal Bones

 

INDICATIONS:  CONTUSION OF OTHER PART OF HEAD, INITIAL ENCOUNTER

 

TECHNIQUE:  3 views of the nasal bones acquired.  

 

COMPARISON:  None

 

FINDINGS:  

 

Bones:  No fractures or dislocations.  Nasal septum is midline.  Normal nasociliary nerve grooves are
 noted.  

 

Soft tissues:  No suspicious soft tissue calcifications.  

 

IMPRESSION:  

Unremarkable nasal bone radiographs

 

Reviewed by: Jeremy Frederick MD on 1/4/2023 12:02 PM [place]

Approved by: Jeremy Frederick MD on 1/4/2023 12:02 PM [facility]

 

 

Station ID:  [id number]

## 2023-01-04 NOTE — XRAY REPORT
PROCEDURE:  Knee 2 View RT

 

INDICATIONS:  CONTUSION OF RIGHT KNEE, INITIAL ENCOUNTER

 

TECHNIQUE:  2 views of the right knee(s) were acquired.  

 

COMPARISON:  None.

 

FINDINGS:  

 

Bones:  No fractures or dislocations.  No suspicious bony lesions.  

 

Soft tissues:  Diffuse atherosclerotic vascular calcification noted. No joint effusion

 

 

IMPRESSION:  Calcific atherosclerosis without fracture or joint effusion

 

Reviewed by: Jeremy Frederick MD on 1/4/2023 12:05 PM AK

Approved by: Jeremy Frederick MD on 1/4/2023 12:05 PM [facility]

 

 

Station ID:  [id number]

## 2023-01-04 NOTE — XRAY REPORT
PROCEDURE:  Ribs w/PA Chest LT

 

INDICATIONS:  RIB PAIN, LEFT SIDED

 

TECHNIQUE:  2 views of the left ribs were acquired, along with a single view chest.  

 

COMPARISON:  None

 

FINDINGS:  

 

Surgical changes and devices:  No. Left scapular orthopedic screws are present. Ne.  

 

Bones and chest wall: Old healed left-sided rib fractures noted involving the lateral sixth, seventh 
and ninth/10th ribs. No evidence of acute fracture .  

 

Lungs and pleura: Left-sided basilar atelectasis and or infiltrate present. Right lung and pleural sp
ace clear

 

Mediastinum:  Mediastinal contours appear normal.  Heart size is normal.  

 

IMPRESSION:  

Old healed left-sided rib fractures without evidence of acute displaced fracture.

 

Left basilar platelike atelectasis and or infiltrate.

 

Reviewed by: Jeremy Frederick MD on 1/4/2023 12:12 PM AK

Approved by: Jeremy Frederick MD on 1/4/2023 12:12 PM [facility]

 

 

Station ID:  [id number]

## 2023-06-14 ENCOUNTER — HOSPITAL ENCOUNTER (OUTPATIENT)
Dept: HOSPITAL 76 - LAB | Age: 68
Discharge: HOME | End: 2023-06-14
Attending: INTERNAL MEDICINE
Payer: MEDICAID

## 2023-06-14 DIAGNOSIS — E78.5: Primary | ICD-10-CM

## 2023-06-14 DIAGNOSIS — I65.22: ICD-10-CM

## 2023-06-14 DIAGNOSIS — N40.1: ICD-10-CM

## 2023-06-14 DIAGNOSIS — R73.01: ICD-10-CM

## 2023-06-14 LAB
ALBUMIN DIAFP-MCNC: 4.2 G/DL (ref 3.2–5.5)
ALBUMIN/GLOB SERPL: 1.3 {RATIO} (ref 1–2.2)
ALP SERPL-CCNC: 59 IU/L (ref 42–121)
ALT SERPL W P-5'-P-CCNC: 25 IU/L (ref 10–60)
ANION GAP SERPL CALCULATED.4IONS-SCNC: 5 MMOL/L (ref 6–13)
AST SERPL W P-5'-P-CCNC: 24 IU/L (ref 10–42)
BASOPHILS NFR BLD AUTO: 0 10^3/UL (ref 0–0.1)
BASOPHILS NFR BLD AUTO: 0.3 %
BILIRUB BLD-MCNC: 0.8 MG/DL (ref 0.2–1)
BUN SERPL-MCNC: 25 MG/DL (ref 6–20)
CALCIUM UR-MCNC: 9.3 MG/DL (ref 8.5–10.3)
CHLORIDE SERPL-SCNC: 104 MMOL/L (ref 101–111)
CHOLEST SERPL-MCNC: 123 MG/DL
CO2 SERPL-SCNC: 28 MMOL/L (ref 21–32)
CREAT SERPLBLD-SCNC: 1 MG/DL (ref 0.6–1.2)
CREAT UR-SCNC: 134.7 MG/DL
EOSINOPHIL # BLD AUTO: 0.1 10^3/UL (ref 0–0.7)
EOSINOPHIL NFR BLD AUTO: 1.9 %
ERYTHROCYTE [DISTWIDTH] IN BLOOD BY AUTOMATED COUNT: 13.7 % (ref 12–15)
EST. AVERAGE GLUCOSE BLD GHB EST-MCNC: 126 MG/DL (ref 70–100)
GFRSERPLBLD MDRD-ARVRAT: 75 ML/MIN/{1.73_M2} (ref 89–?)
GLOBULIN SER-MCNC: 3.3 G/DL (ref 2.1–4.2)
GLUCOSE SERPL-MCNC: 108 MG/DL (ref 70–100)
HBA1C MFR BLD HPLC: 6 % (ref 4.27–6.07)
HCT VFR BLD AUTO: 44.5 % (ref 42–52)
HDLC SERPL-MCNC: 57 MG/DL
HDLC SERPL: 2.2 {RATIO} (ref ?–5)
HGB UR QL STRIP: 14.5 G/DL (ref 14–18)
LDLC SERPL CALC-MCNC: 49 MG/DL
LDLC/HDLC SERPL: 0.9 {RATIO} (ref ?–3.6)
LYMPHOCYTES # SPEC AUTO: 1.4 10^3/UL (ref 1.5–3.5)
LYMPHOCYTES NFR BLD AUTO: 24.3 %
MCH RBC QN AUTO: 30.3 PG (ref 27–31)
MCHC RBC AUTO-ENTMCNC: 32.6 G/DL (ref 32–36)
MCV RBC AUTO: 92.9 FL (ref 80–94)
MICROALBUMIN UR-MCNC: 0.4 MG/DL (ref 0–300)
MICROALBUMIN/CREAT RATIO PNL UR: 3 UG/MG (ref ?–30)
MONOCYTES # BLD AUTO: 0.6 10^3/UL (ref 0–1)
MONOCYTES NFR BLD AUTO: 9.6 %
NEUTROPHILS # BLD AUTO: 3.8 10^3/UL (ref 1.5–6.6)
NEUTROPHILS # SNV AUTO: 5.9 X10^3/UL (ref 4.8–10.8)
NEUTROPHILS NFR BLD AUTO: 63.7 %
NRBC # BLD AUTO: 0 /100WBC
NRBC # BLD AUTO: 0 X10^3/UL
PDW BLD AUTO: 9.6 FL (ref 7.4–11.4)
PLATELET # BLD: 275 10^3/UL (ref 130–450)
POTASSIUM SERPL-SCNC: 4.3 MMOL/L (ref 3.5–5)
PROT SPEC-MCNC: 7.5 G/DL (ref 6.7–8.2)
RBC MAR: 4.79 10^6/UL (ref 4.7–6.1)
SODIUM SERPLBLD-SCNC: 137 MMOL/L (ref 135–145)
TRIGL P FAST SERPL-MCNC: 83 MG/DL
VLDLC SERPL-SCNC: 17 MG/DL

## 2023-06-14 PROCEDURE — 83036 HEMOGLOBIN GLYCOSYLATED A1C: CPT

## 2023-06-14 PROCEDURE — 82043 UR ALBUMIN QUANTITATIVE: CPT

## 2023-06-14 PROCEDURE — 85025 COMPLETE CBC W/AUTO DIFF WBC: CPT

## 2023-06-14 PROCEDURE — 36415 COLL VENOUS BLD VENIPUNCTURE: CPT

## 2023-06-14 PROCEDURE — 82570 ASSAY OF URINE CREATININE: CPT

## 2023-06-14 PROCEDURE — 80053 COMPREHEN METABOLIC PANEL: CPT

## 2023-06-14 PROCEDURE — 83721 ASSAY OF BLOOD LIPOPROTEIN: CPT

## 2023-06-14 PROCEDURE — 84153 ASSAY OF PSA TOTAL: CPT

## 2023-06-14 PROCEDURE — 80061 LIPID PANEL: CPT

## 2023-07-13 ENCOUNTER — HOSPITAL ENCOUNTER (OUTPATIENT)
Dept: HOSPITAL 76 - LAB.N | Age: 68
Discharge: HOME | End: 2023-07-13
Attending: NURSE PRACTITIONER
Payer: MEDICARE

## 2023-07-13 DIAGNOSIS — M25.421: Primary | ICD-10-CM

## 2023-07-13 PROCEDURE — 87070 CULTURE OTHR SPECIMN AEROBIC: CPT

## 2023-07-13 PROCEDURE — 87205 SMEAR GRAM STAIN: CPT

## 2024-02-28 ENCOUNTER — HOSPITAL ENCOUNTER (OUTPATIENT)
Dept: HOSPITAL 76 - DI.N | Age: 69
Discharge: HOME | End: 2024-02-28
Attending: NURSE PRACTITIONER
Payer: MEDICAID

## 2024-02-28 DIAGNOSIS — S22.41XA: Primary | ICD-10-CM

## 2024-02-28 NOTE — XRAY REPORT
PROCEDURE:  Ribs w/PA Chest 3+V RT

 

INDICATIONS:  CONTUSION OF THORAX, UNSPECIFIED

 

TECHNIQUE:  2 views of the ribs were acquired, along with a single view chest.  

 

COMPARISON:  None.

 

FINDINGS:  

 

Surgical changes and devices:  None.  

 

Bones and chest wall:  There are right anterior lateral fifth, sixth and possibly seventh rib fractur
es. They are minimally displaced..  No suspicious bony lesions.  Overlying soft tissues appear unrema
rkable.  

 

Lungs and pleura:  No pleural effusions or pneumothorax.  Lungs appear clear.  

 

Mediastinum:  Mediastinal contours appear normal.  Heart size is normal.  

 

 

IMPRESSION:  

Minimally displaced fifth, sixth and suspected seventh rib fractures.

 

 

 

Reviewed by: Keara Hernández MD on 2/28/2024 2:57 PM PST

Approved by: Keara Hernández MD on 2/28/2024 2:57 PM [facility]

 

 

Station ID:  [id number]

## 2024-03-21 ENCOUNTER — HOSPITAL ENCOUNTER (OUTPATIENT)
Dept: HOSPITAL 76 - DI | Age: 69
Discharge: HOME | End: 2024-03-21
Attending: INTERNAL MEDICINE
Payer: MEDICAID

## 2024-03-21 DIAGNOSIS — I65.23: Primary | ICD-10-CM

## 2024-03-21 PROCEDURE — 93880 EXTRACRANIAL BILAT STUDY: CPT

## 2024-03-22 NOTE — ULTRASOUND REPORT
PROCEDURE:  Carotid Doppler Complete

 

INDICATIONS:  Carotid stenosis, hyperlipidemia

 

TECHNIQUE:  

Color and pulse Doppler interrogation was performed of both carotid systems, with image documentation
 and velocity measurements.  

 

COMPARISON:  None.

 

FINDINGS:  

 

Right side:  

Brachial blood pressure:  144/72 mm Hg.  

Common carotid artery peak systolic velocity:  167 cm/sec.  

Internal carotid artery peak systolic velocity:  80 cm/sec.  

Internal carotid artery end diastolic velocity:  18 cm/sec.  

External carotid artery peak systolic velocity:  126 cm/sec.  

ICA/CCA peak systolic ratio:  0.5 .  

Gray scale imaging description:  Mild-to-moderate amount of atherosclerotic plaque. 

Percent internal carotid artery stenosis:  Less than 50%.  

Vertebral artery:  Flow direction is antegrade.  

 

Left side:  

Brachial blood pressure:  129/71 mm Hg.

Common carotid artery peak systolic velocity:  126 cm/sec.  

Internal carotid artery peak systolic velocity:  99 cm/sec.  

Internal carotid artery end diastolic velocity:  25 cm/sec.  

External carotid artery peak systolic velocity:  1.7 cm/sec.  

ICA/CCA peak systolic ratio:  0.8 .  

Gray scale imaging description:  Mild-to-moderate amount of atherosclerotic plaque. 

Percent internal carotid artery stenosis:  Less than 50%.  

Vertebral artery:  Flow direction is antegrade.  

 

IMPRESSION:  

 

1. In the right internal carotid artery, there is less than 50 percent stenosis based on peak systoli
c velocity criteria.

2. In the left internal carotid artery, there is less than 50 percent stenosis based on peak systolic
 velocity criteria.

3. Antegrade blood flow within the right vertebral artery.

4. Antegrade blood flow within the left vertebral artery.

 

The estimate of stenosis included in the report of the imaging study was calculated using the Psychiatric-end
orsed standards of carotid artery stenosis.

 

Reviewed by: Elías Lyon MD on 3/22/2024 4:52 PM PDT

Approved by: Elías Lyon MD on 3/22/2024 4:52 PM PDT

 

 

Station ID:  [id number]

## 2024-05-17 ENCOUNTER — HOSPITAL ENCOUNTER (OUTPATIENT)
Dept: HOSPITAL 76 - LAB | Age: 69
Discharge: HOME | End: 2024-05-17
Attending: INTERNAL MEDICINE
Payer: MEDICAID

## 2024-05-17 DIAGNOSIS — I65.29: ICD-10-CM

## 2024-05-17 DIAGNOSIS — E78.5: ICD-10-CM

## 2024-05-17 DIAGNOSIS — R73.03: Primary | ICD-10-CM

## 2024-05-17 DIAGNOSIS — N40.1: ICD-10-CM

## 2024-05-17 LAB
ALBUMIN DIAFP-MCNC: 4.4 G/DL (ref 3.2–5.5)
ALBUMIN/GLOB SERPL: 1.7 {RATIO} (ref 1–2.2)
ALP SERPL-CCNC: 68 IU/L (ref 42–121)
ALT SERPL W P-5'-P-CCNC: 24 IU/L (ref 10–60)
ANION GAP SERPL CALCULATED.4IONS-SCNC: 4 MMOL/L (ref 6–13)
AST SERPL W P-5'-P-CCNC: 23 IU/L (ref 10–42)
BASOPHILS NFR BLD AUTO: 0 10^3/UL (ref 0–0.1)
BASOPHILS NFR BLD AUTO: 0.4 %
BILIRUB BLD-MCNC: 0.9 MG/DL (ref 0.2–1)
BUN SERPL-MCNC: 22 MG/DL (ref 6–20)
CALCIUM UR-MCNC: 9.6 MG/DL (ref 8.5–10.3)
CHLORIDE SERPL-SCNC: 101 MMOL/L (ref 101–111)
CHOLEST SERPL-MCNC: 125 MG/DL
CO2 SERPL-SCNC: 30 MMOL/L (ref 21–32)
CREAT SERPLBLD-SCNC: 1 MG/DL (ref 0.6–1.3)
EOSINOPHIL # BLD AUTO: 0.1 10^3/UL (ref 0–0.7)
EOSINOPHIL NFR BLD AUTO: 1.9 %
ERYTHROCYTE [DISTWIDTH] IN BLOOD BY AUTOMATED COUNT: 13.3 % (ref 12–15)
EST. AVERAGE GLUCOSE BLD GHB EST-MCNC: 134 MG/DL (ref 70–100)
GFRSERPLBLD MDRD-ARVRAT: 75 ML/MIN/{1.73_M2} (ref 89–?)
GLOBULIN SER-MCNC: 2.6 G/DL (ref 2.1–4.2)
GLUCOSE SERPL-MCNC: 112 MG/DL (ref 74–104)
HBA1C MFR BLD HPLC: 6.3 % (ref 4.27–6.07)
HCT VFR BLD AUTO: 42.8 % (ref 42–52)
HDLC SERPL-MCNC: 53 MG/DL
HDLC SERPL: 2.4 {RATIO} (ref ?–5)
HGB UR QL STRIP: 13.8 G/DL (ref 14–18)
LDLC SERPL CALC-MCNC: 47 MG/DL
LDLC/HDLC SERPL: 0.9 {RATIO} (ref ?–3.6)
LYMPHOCYTES # SPEC AUTO: 1.3 10^3/UL (ref 1.5–3.5)
LYMPHOCYTES NFR BLD AUTO: 23.6 %
MCH RBC QN AUTO: 29.9 PG (ref 27–31)
MCHC RBC AUTO-ENTMCNC: 32.2 G/DL (ref 32–36)
MCV RBC AUTO: 92.6 FL (ref 80–94)
MONOCYTES # BLD AUTO: 0.6 10^3/UL (ref 0–1)
MONOCYTES NFR BLD AUTO: 10.6 %
NEUTROPHILS # BLD AUTO: 3.4 10^3/UL (ref 1.5–6.6)
NEUTROPHILS # SNV AUTO: 5.4 X10^3/UL (ref 4.8–10.8)
NEUTROPHILS NFR BLD AUTO: 63.3 %
NRBC # BLD AUTO: 0 /100WBC
NRBC # BLD AUTO: 0 X10^3/UL
PDW BLD AUTO: 9.2 FL (ref 7.4–11.4)
PLATELET # BLD: 265 10^3/UL (ref 130–450)
POTASSIUM SERPL-SCNC: 4.1 MMOL/L (ref 3.5–4.5)
PROT SPEC-MCNC: 7 G/DL (ref 6.4–8.9)
RBC MAR: 4.62 10^6/UL (ref 4.7–6.1)
SODIUM SERPLBLD-SCNC: 135 MMOL/L (ref 135–145)
TRIGL P FAST SERPL-MCNC: 126 MG/DL (ref 48–352)
VLDLC SERPL-SCNC: 25 MG/DL

## 2024-05-17 PROCEDURE — 80053 COMPREHEN METABOLIC PANEL: CPT

## 2024-05-17 PROCEDURE — 83721 ASSAY OF BLOOD LIPOPROTEIN: CPT

## 2024-05-17 PROCEDURE — 82570 ASSAY OF URINE CREATININE: CPT

## 2024-05-17 PROCEDURE — 82043 UR ALBUMIN QUANTITATIVE: CPT

## 2024-05-17 PROCEDURE — 83036 HEMOGLOBIN GLYCOSYLATED A1C: CPT

## 2024-05-17 PROCEDURE — 84153 ASSAY OF PSA TOTAL: CPT

## 2024-05-17 PROCEDURE — 36415 COLL VENOUS BLD VENIPUNCTURE: CPT

## 2024-05-17 PROCEDURE — 80061 LIPID PANEL: CPT

## 2024-05-17 PROCEDURE — 85025 COMPLETE CBC W/AUTO DIFF WBC: CPT

## 2024-05-18 ENCOUNTER — HOSPITAL ENCOUNTER (OUTPATIENT)
Dept: HOSPITAL 76 - LAB | Age: 69
Discharge: HOME | End: 2024-05-18
Attending: INTERNAL MEDICINE
Payer: MEDICARE

## 2024-05-18 DIAGNOSIS — R73.03: Primary | ICD-10-CM

## 2024-05-18 LAB
CREAT UR-SCNC: 124.7 MG/DL
MICROALBUMIN UR-MCNC: < 0.7 MG/DL
MICROALBUMIN/CREAT RATIO PNL UR: (no result) UG/MG (ref ?–30)

## 2024-05-18 PROCEDURE — 82043 UR ALBUMIN QUANTITATIVE: CPT

## 2024-05-18 PROCEDURE — 82570 ASSAY OF URINE CREATININE: CPT
